# Patient Record
Sex: MALE | NOT HISPANIC OR LATINO | URBAN - METROPOLITAN AREA
[De-identification: names, ages, dates, MRNs, and addresses within clinical notes are randomized per-mention and may not be internally consistent; named-entity substitution may affect disease eponyms.]

---

## 2019-01-01 ENCOUNTER — OFFICE VISIT (OUTPATIENT)
Dept: URBAN - METROPOLITAN AREA CLINIC 32 | Facility: CLINIC | Age: 74
End: 2019-01-01
Payer: MEDICARE

## 2019-01-01 DIAGNOSIS — H25.13 CATARACT - NSC: Primary | ICD-10-CM

## 2019-01-01 DIAGNOSIS — H52.4 PRESBYOPIA: ICD-10-CM

## 2019-01-01 PROCEDURE — 92014 COMPRE OPH EXAM EST PT 1/>: CPT | Performed by: OPTOMETRIST

## 2019-01-01 ASSESSMENT — VISUAL ACUITY
OD: 20/20
OS: 20/25

## 2019-01-01 ASSESSMENT — INTRAOCULAR PRESSURE
OS: 21
OD: 17

## 2019-01-18 NOTE — IMPRESSION/PLAN
Impression: Cataract - [facility] [place]: H25.13. Plan: Cataracts account for the patient's complaints. Discussed options, surgery or spectacle change. Explained surgery risks, benefits, procedures and recovery. Patient defers surgery and elects to change glasses first.  If there is no improvement, ok to schedule surgery.